# Patient Record
Sex: MALE | Race: OTHER | HISPANIC OR LATINO | URBAN - METROPOLITAN AREA
[De-identification: names, ages, dates, MRNs, and addresses within clinical notes are randomized per-mention and may not be internally consistent; named-entity substitution may affect disease eponyms.]

---

## 2020-12-09 ENCOUNTER — EMERGENCY (EMERGENCY)
Facility: HOSPITAL | Age: 45
LOS: 1 days | Discharge: HOME | End: 2020-12-09
Attending: EMERGENCY MEDICINE
Payer: COMMERCIAL

## 2020-12-09 VITALS
HEART RATE: 83 BPM | DIASTOLIC BLOOD PRESSURE: 89 MMHG | SYSTOLIC BLOOD PRESSURE: 157 MMHG | OXYGEN SATURATION: 96 % | RESPIRATION RATE: 20 BRPM | WEIGHT: 199.96 LBS | TEMPERATURE: 98 F | HEIGHT: 68 IN

## 2020-12-09 PROCEDURE — 73080 X-RAY EXAM OF ELBOW: CPT | Mod: 26,LT

## 2020-12-09 PROCEDURE — 99053 MED SERV 10PM-8AM 24 HR FAC: CPT

## 2020-12-09 PROCEDURE — 71101 X-RAY EXAM UNILAT RIBS/CHEST: CPT | Mod: 26

## 2020-12-09 PROCEDURE — 90715 TDAP VACCINE 7 YRS/> IM: CPT

## 2020-12-09 PROCEDURE — 99284 EMERGENCY DEPT VISIT MOD MDM: CPT

## 2020-12-09 PROCEDURE — T1013: CPT

## 2020-12-09 PROCEDURE — 73590 X-RAY EXAM OF LOWER LEG: CPT | Mod: 26,RT

## 2020-12-09 PROCEDURE — 90471 IMMUNIZATION ADMIN: CPT

## 2020-12-09 PROCEDURE — 73590 X-RAY EXAM OF LOWER LEG: CPT

## 2020-12-09 PROCEDURE — 73080 X-RAY EXAM OF ELBOW: CPT

## 2020-12-09 PROCEDURE — 71101 X-RAY EXAM UNILAT RIBS/CHEST: CPT

## 2020-12-09 PROCEDURE — 99284 EMERGENCY DEPT VISIT MOD MDM: CPT | Mod: 25

## 2020-12-09 RX ORDER — IBUPROFEN 200 MG
1 TABLET ORAL
Qty: 20 | Refills: 0
Start: 2020-12-09

## 2020-12-09 RX ORDER — TETANUS TOXOID, REDUCED DIPHTHERIA TOXOID AND ACELLULAR PERTUSSIS VACCINE, ADSORBED 5; 2.5; 8; 8; 2.5 [IU]/.5ML; [IU]/.5ML; UG/.5ML; UG/.5ML; UG/.5ML
0.5 SUSPENSION INTRAMUSCULAR ONCE
Refills: 0 | Status: COMPLETED | OUTPATIENT
Start: 2020-12-09 | End: 2020-12-09

## 2020-12-09 RX ADMIN — TETANUS TOXOID, REDUCED DIPHTHERIA TOXOID AND ACELLULAR PERTUSSIS VACCINE, ADSORBED 0.5 MILLILITER(S): 5; 2.5; 8; 8; 2.5 SUSPENSION INTRAMUSCULAR at 08:04

## 2020-12-09 NOTE — ED ADULT NURSE NOTE - INTERVENTIONS DEFINITIONS
[place] to call system/Non-slip footwear when patient is off stretcher/Call bell, personal items and telephone within reach
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
## 2020-12-09 NOTE — ED PROVIDER NOTE - WET READ LAUNCH FT
There are no Wet Read(s) to document. There is 1 Wet Read(s) to document. There are 3 Wet Read(s) to document.

## 2020-12-09 NOTE — ED PROVIDER NOTE - CARE PLAN
Principal Discharge DX:	Abrasion  Secondary Diagnosis:	Contusion   Principal Discharge DX:	Rib fracture  Secondary Diagnosis:	Abrasions of multiple sites   Principal Discharge DX:	Rib pain  Secondary Diagnosis:	Abrasions of multiple sites

## 2020-12-09 NOTE — ED PROVIDER NOTE - PATIENT PORTAL LINK FT
You can access the FollowMyHealth Patient Portal offered by [facility] by registering at the following website: [email or web address]. By joining [employer]’s FollowMyHealth portal, you will also be able to view your health information using other applications (apps) compatible with our system. You can access the FollowMyHealth Patient Portal offered by [facility] by registering at the following website: [email or web address]. By joining [email or web address]’s FollowMyHealth portal, you will also be able to view your health information using other applications (apps) compatible with our system.

## 2020-12-09 NOTE — ED PROVIDER NOTE - PHYSICAL EXAMINATION
Gen: No acute distress, non toxic  HEENT: Mucous membranes moist, pink conjunctivae, EOMI. NCAT  Neck: no midline ttp, full rom without pain  CV: RRR, nl s1/s2. no chest wall ttp or bruising  Resp: CTAB, normal rate and effort  GI: Abdomen soft, NT, ND. No rebound, no guarding  : No CVAT  Neuro: A&O x 3, moving all 4 extremities  MSK: No spine or joint tenderness to palpation 5 cm abrasion to left dorsal/lateral elbow region, no sigificant bleeding. full rom, no significant ttp over ribs or left calf. no swelling/deformity or bruising. neurovascularly intact  Skin: No rashes. intact and perfused. no bruising/swelling Gen: No acute distress, non toxic  HEENT: Mucous membranes moist, pink conjunctivae, EOMI. NCAT  Neck: no midline ttp, full rom without pain  CV: RRR, nl s1/s2. no chest wall ttp or bruising  Resp: CTAB, normal rate and effort  GI: Abdomen soft, NT, ND. No rebound, no guarding  : No CVAT  Neuro: A&O x 3, moving all 4 extremities  MSK: No spine or joint tenderness to palpation 5 cm abrasion to left dorsal/lateral elbow region with ~1.5 cm deeper break in skin with small fat exposed, no sigificant bleeding. full rom, no significant ttp over ribs or left calf. no swelling/deformity or bruising. neurovascularly intact  Skin: No rashes. intact and perfused. no bruising/swelling. superficial abrasion to left upper hip/lower back.
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
## 2020-12-09 NOTE — ED PROVIDER NOTE - PROGRESS NOTE DETAILS
Catie: pt feeling well, no other nick ttp. stable for d/c. all debris from abrasion removed return precautions.
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
## 2020-12-09 NOTE — ED PROVIDER NOTE - NSFOLLOWUPINSTRUCTIONS_ED_ALL_ED_FT
Cambie el vendaje de la herida a diario, use antibióticos tópicos. Contusión    Chantelle contusión es un hematoma profundo. Las contusiones son el resultado de chantelle lesión contundente en los tejidos y las fibras musculares debajo de la piel. La piel que recubre la contusión puede volverse eva, heena o amarilla. Los síntomas también incluyen dolor e hinchazón en el área lesionada.    BUSQUE ATENCIÓN MÉDICA INMEDIATA SI TIENE ALGUNO DE LOS SIGUIENTES SÍNTOMAS: dolor severo, entumecimiento, hormigueo, dolor, debilidad o cambio de color / temperatura de la piel en cualquier parte del cuerpo distal a la lesión. Cambie el vendaje de la herida a diario, use antibióticos tópicos. Contusión    Tiene 2 fracturas de costillas. [place] tylenol / motrin para el dolor, respire profundamente según las instrucciones para prevenir la neumonía. Regrese por empeoramiento del dolor, mareos / aturdimiento, dolor abdominal o cualquier síntoma que empeore.    Chantelle contusión es un hematoma profundo. Las contusiones son el resultado de chantelle lesión contundente en los tejidos y las fibras musculares debajo de la piel. La piel que recubre la contusión puede volverse eva, heena o amarilla. Los síntomas también incluyen dolor e hinchazón en el área lesionada.    BUSQUE ATENCIÓN MÉDICA INMEDIATA SI TIENE ALGUNO DE LOS SIGUIENTES SÍNTOMAS: dolor severo, entumecimiento, hormigueo, dolor, debilidad o cambio de color / temperatura de la piel en cualquier parte del cuerpo distal a la lesión.